# Patient Record
Sex: FEMALE | Race: WHITE | Employment: FULL TIME | URBAN - METROPOLITAN AREA
[De-identification: names, ages, dates, MRNs, and addresses within clinical notes are randomized per-mention and may not be internally consistent; named-entity substitution may affect disease eponyms.]

---

## 2024-09-19 ENCOUNTER — HOSPITAL ENCOUNTER (OUTPATIENT)
Dept: MAMMOGRAPHY | Facility: HOSPITAL | Age: 29
Discharge: HOME | End: 2024-09-19
Attending: FAMILY MEDICINE
Payer: MEDICAID

## 2024-09-19 DIAGNOSIS — N63.10 MASS OF RIGHT BREAST, UNSPECIFIED QUADRANT: ICD-10-CM

## 2024-10-03 ENCOUNTER — HOSPITAL ENCOUNTER (OUTPATIENT)
Dept: MAMMOGRAPHY | Facility: HOSPITAL | Age: 29
Discharge: HOME | End: 2024-10-03
Attending: FAMILY MEDICINE
Payer: MEDICAID

## 2024-10-03 DIAGNOSIS — N63.0 BREAST LUMP: ICD-10-CM

## 2024-10-03 PROCEDURE — 77066 DX MAMMO INCL CAD BI: CPT | Performed by: FAMILY MEDICINE

## 2024-10-03 PROCEDURE — 76642 ULTRASOUND BREAST LIMITED: CPT | Performed by: FAMILY MEDICINE

## 2024-10-03 PROCEDURE — 77062 BREAST TOMOSYNTHESIS BI: CPT | Performed by: FAMILY MEDICINE

## 2024-10-03 NOTE — IMAGING NOTE
Pita Paiz is recommended for an ultrasound guided biopsy of the right breast x one site by Dr.Chaudhari Pita Paiz's preferred language is Bahraini  1439: With the assistance of language line  [facility] [id number] spoke with Pita Ureñajaja  -Screening and education complete  -Medications and allergies reviewed.  -Ultrasound guided breast biopsy procedure reviewed including pre and post procedure instruction-see below.    History Right breast Mass  Findings- Ultrasound examination demonstrates a mass measuring approximately 1.9 x 1.9 x 1.8 cm.  This is suspicious for a fibroadenoma and is likely benign, however an ultrasound-guided biopsy is recommended.   Recommendation- ULTRASOUND-GUIDED BIOPSY: RIGHT BREAST    See EMR for complete imaging report    Medications and allergies reviewed:  No current outpatient medications on file.       Pita Izabel denies the use of prescribed anticoagulants, denies known bleeding disorders and/or liver disease, denies chemotherapy    Procedure explained and questions answered.   Pita Paiz provided with [place]i written educational material by the imaging staff at the time of the recommendation. .     Ms. Paiz instructed to take 1000 mg of acetaminophen on the day of the biopsy, eat a light meal, and bring or wear a sport bra.  Post biopsy care and instruction reviewed: including no lifting more than five pounds, no upper body exercise, icing of biopsy site, no submerging in water.  Pita Paiz verbalized understanding.    Pita Paiz informed that the [facility] schedulers would be contacting her once the biopsy order is received to schedule an appointment.   Pita Paiz requesting Friday, October 11 appointment.

## 2024-10-18 ENCOUNTER — HOSPITAL ENCOUNTER (OUTPATIENT)
Dept: MAMMOGRAPHY | Facility: HOSPITAL | Age: 29
Discharge: HOME | End: 2024-10-18
Attending: FAMILY MEDICINE
Payer: MEDICAID

## 2024-10-18 DIAGNOSIS — N63.0 BREAST NODULE: ICD-10-CM

## 2024-10-18 PROCEDURE — 19083 BX BREAST 1ST LESION US IMAG: CPT | Performed by: FAMILY MEDICINE

## 2024-10-18 PROCEDURE — 88305 TISSUE EXAM BY PATHOLOGIST: CPT

## 2024-10-18 PROCEDURE — 77065 DX MAMMO INCL CAD UNI: CPT | Performed by: FAMILY MEDICINE

## 2024-10-18 NOTE — DISCHARGE INSTRUCTIONS
Instrucciones de garrett-Proveedor de pedidos  Estereotáctica / Ultrasonido / Biopsia con aguja gruesa por resonancia magnética       Coloque mukesh compresa de hielo en la parte superior del sitio de la biopsia en coronel sostén O en los pliegues de la envoltura Ace hector 10 a 15 minutos cada hora hasta la hora de acostarse para coronel comodidad y para disminuir el sangrado.     Mantenga coronel sostén de apoyo O la envoltura Ace en coornel lugar hector 24 horas después de coronel biopsia. Esta compresión disminuye el sangrado y el movimiento de los senos para coronel comodidad. Use un sostén de apoyo hector los próximos días para mayor comodidad (así andrea para dormir)     No bañarse ni ducharse hector las primeras 24 horas después de la biopsia. Después de cecilia tiempo, puede odessa un baño o mukesh ducha. No pasa nada si las tiras estériles se mojan ninfa no las empapes.   No hay saunas, jacuzzis o piscinas hasta que las tiras estériles se caigan (5-7 días). Arbuckle previene la infección y da tiempo para que el área se cierre y sane por completo.     NO retire las tiras estériles. Se caerán en 5 días a dos semanas. Si se desarrolla algún tipo de irritación (enrojecimiento, picazón o ampollas) en el área alrededor de las tiras estériles, retírelas suavemente. Mantenga el área limpia y seca.     Es normal tener molestias leves y hematomas en el sitio de la biopsia.      Puede odessa Tylenol según sea necesario para las molestias.     NO participe en actividades extenuantes (aeróbicos, levantamiento de objetos pesados, tareas domésticas, jardinería, etc.) 48 horas después de la biopsia para prevenir el sangrado.     Recibirá los resultados de croonel proveedor de pedidos. Póngase en contacto con ellos si tiene alguna pregunta.    Si tiene alguna pregunta sobre el procedimiento, comuníquese con la enfermera coordinadora de atención mamaria al [phone]. (L-F 7:30-4)    Si se va a someter a mukesh biopsia de mama por resonancia magnética o a mukesh biopsia guiada  por ecografía, se le facturará la biopsia y mukesh mamografía unilateral por separado.    Se recomendará mukesh mamografía/ecografía diagnóstica de seguimiento de 6 meses o un año para documentar la estabilidad del sitio de la biopsia. Se puede programar en el [facility] o en el [facility] kristyn enamorado [phone]. Necesitará mukesh orden para cecilia examen de coronel médico remitente.    5/2024

## 2024-10-22 ENCOUNTER — TELEPHONE (OUTPATIENT)
Dept: MAMMOGRAPHY | Facility: HOSPITAL | Age: 29
End: 2024-10-22

## 2024-10-22 NOTE — TELEPHONE ENCOUNTER
Telephoned Pita Ureñanjgertrudisemilia with the assistance of  [facility] # [id number] and name,  verified with patient. Notified Pita Gonzalezemilia of right breast negative for malignancy but positive for fibroepithelial lesion  biopsy result. Concordance pending. Pita Ureñajaja reports biopsy site is healing well.  Radiologist recommends surgical consultation. The patient was referred to Dr Kearney or Dr Mccain. The contact information was provided to the patient and the patient was instructed to call for a consultation appt. Pt verbalized understanding and had no further questions at this time.

## 2024-10-31 NOTE — CONSULTS
Breast Surgery New Patient Consultation    Pita Paiz is a 29 year old patient, referred by Dr. Kendrick, who presents for evaluation of right breast fibroepithelial lesion.    History of Present Illness:   Ms. Pita Paiz is a 29 year old woman  who presents for evaluation of right breast fibroepithelial lesion.     The patient had a right breast diagnostic mammogram on 10/3/2024 due to a mass of the right breast.  Ultrasound showed a mass measuring 1.9 x 1.9 x 1.8 cm.  This was suspicious for fibroadenoma however biopsy was recommended.  This was located at the 11 o'clock position.  Biopsy was performed on 10/18/2024, a vision clip was placed.  Of note the patient had a hematoma.  Of note the patient has density C breast tissue.  Pathology showed a fibroepithelial lesion and phyllodes tumor cannot be excluded.    She denies any nipple discharge, skin changes, or axillary adenopathy. She does not have breast pain. She does not have significant past history for breast diseases or breast biopsy. She does not have family history of breast cancer. She does \not have a history of genetic testing. She is from [facility] but lived in [place] [place] [facility] for 14 years.           History reviewed. No pertinent past medical history.    Past Surgical History:   Procedure Laterality Date    Susanne localization wire 1 site right (cpt=19281)      DAVID AGE 23       Gynecological History:  Menarche at age 10 and LMP 22  Pt is a   Pt was 23 years old at time of first pregnancy.    She has cumulative breastfeeding history of 3 months.  Age of Menopause: n/a  Type: n/a  She denies any history of hormone replacement therapy.  She has history of oral contraceptive use for 5 years, last .   She denies infertility treatment to achieve pregnancy.    Medications:    No outpatient medications have been marked as taking for the 24 encounter (Office Visit) with Marcelina Kearney MD.       Allergies:    Allergies[1]    Family History:   History  reviewed. No pertinent family history.    She is not of Ashkenazi Temple ancestry.    Social History:  History   Alcohol use Not on file       History   Smoking Status    Not on file   Smokeless Tobacco    Not on file       Review of Systems:    Review of Systems   Constitutional:  Negative for activity change, appetite change, chills, fatigue and unexpected weight change.   HENT:  Negative for ear pain, hearing loss, nosebleeds, sore throat, trouble swallowing and voice change.    Eyes:  Negative for pain and visual disturbance.   Respiratory:  Negative for cough, chest tightness and shortness of breath.    Cardiovascular:  Negative for chest pain, palpitations and leg swelling.   Gastrointestinal:  Negative for nausea, vomiting, abdominal pain, diarrhea, constipation and blood in stool.   Endocrine: Negative for cold intolerance and heat intolerance.   Genitourinary:  Negative for dysuria, hematuria and difficulty urinating.   Musculoskeletal:  Negative for back pain, joint swelling, joint pain and neck pain.   Skin:  Negative for color change, rash and wound.   Allergic/Immunologic: Negative for environmental allergies.   Neurological:  Negative for tremors, syncope, facial asymmetry, speech difficulty and weakness.   Hematological:  Negative for adenopathy. Does not bruise/bleed easily.   Psychiatric/Behavioral:  Negative for hallucinations, behavioral problems, confusion, agitation and depressed mood.       Otherwise as per HPI.    Physical Exam:    There were no vitals taken for this visit.    Physical Exam  Vitals reviewed.   Constitutional:       Appearance: Normal appearance.   HENT:      Head: Normocephalic and atraumatic.   Eyes:      Extraocular Movements: Extraocular movements intact.      Pupils: Pupils are equal, round, and reactive to light.   Cardiovascular:      Rate and Rhythm: Normal rate.   Pulmonary:      Effort: Pulmonary effort is normal.   Chest:   Breasts:     Right: Mass present. No nipple  discharge, skin change or tenderness.      Left: Normal. No mass, nipple discharge, skin change or tenderness.       Abdominal:      General: Abdomen is flat.      Palpations: Abdomen is soft.   Musculoskeletal:         General: Normal range of motion.      Cervical back: Normal range of motion and neck supple.   Lymphadenopathy:      Upper Body:      Right upper body: No supraclavicular or axillary adenopathy.      Left upper body: No supraclavicular or axillary adenopathy.   Skin:     General: Skin is warm and dry.   Neurological:      General: No focal deficit present.      Mental Status: She is alert and oriented to person, place, and time.   Psychiatric:         Mood and Affect: Mood normal.          Bra size: L    Labs/imaging: reviewed in EPIC    Impression:   Ms. Pita Paiz is a 29 year old woman that presents for evaluation of right breast fibroepithelial lesion.    Discussion and Plan:  I had a discussion with the Patient and her friend regarding her breast exam. On exam today I found a 2 x 3 cm mass in the right breast, about 11:00, 2 cm from nipple. I personally reviewed her recent imaging and pathology and we discussed this. A friend was used for translation purposes.     We discussed that a fibroadenoma is a benign growth of tissue that does not increase risk of breast cancer. We discussed that fibroadenomas are usually removed if they have rapid growth, are over 3 cm in size, have an irregular shape/indistinct margins on imaging, or are causing breast deformity.  Pathology showing \"fibroepithelial lesion\" could mean the patient has a fibroadenoma or a phyllodes tumor. We discussed that phyllodes tumor can be benign borderline or malignant.  We discussed that at times only final pathology can reveal a diagnosis.  At this point we should remove this lesion in order to confirm final pathology.  I do recommend excision at this time.  We discussed right breast wire localized excisional biopsy.  We  discussed the risks and benefits of surgery and the patient would like to proceed.     Plan: Right breast wire localized excisional biopsy  Localize: Right breast fibroepithelial lesion, vision clip (11:00 position)      She was given ample opportunity for questions and those questions were answered to her satisfaction. She has been encouraged to contact the office with any questions or concerns. This encounter lasted a total of 50 minutes, more than 50% of which was dedicated to the discussion of management options.     Marcelina Kearney MD  Breast Surgical Oncology    CC: Dr. Kendrick            [1] Not on File

## 2024-11-05 ENCOUNTER — OFFICE VISIT (OUTPATIENT)
Dept: SURGERY | Facility: CLINIC | Age: 29
End: 2024-11-05
Payer: MEDICAID

## 2024-11-05 VITALS
WEIGHT: 151.63 LBS | HEART RATE: 94 BPM | RESPIRATION RATE: 16 BRPM | OXYGEN SATURATION: 94 % | DIASTOLIC BLOOD PRESSURE: 71 MMHG | TEMPERATURE: 98 F | SYSTOLIC BLOOD PRESSURE: 111 MMHG

## 2024-11-05 DIAGNOSIS — D24.1 FIBROADENOMA OF BREAST, RIGHT: Primary | ICD-10-CM

## 2024-11-05 PROCEDURE — 99245 OFF/OP CONSLTJ NEW/EST HI 55: CPT | Performed by: SURGERY

## 2024-11-05 NOTE — PATIENT INSTRUCTIONS
Dr. 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  You will need an adult  to stay with you the first night following your surgery.  You must remove any kind of makeup, acrylic nails, lotions, powders, creams or deodorant.  [facility] ONLY: Pre-admission will give instructions on when to take Gatorade and Tylenol/acetaminophen prior to your surgery, purchase 2 - 12oz bottles of regular Gatorade (NOT RED/SUGAR FREE). Otherwise, you may not eat or drink anything else after 11PM the night before surgery.    Wear comfortable clothing that can be easily removed.  If you wear dentures, contacts lenses, or any prosthesis, you will be asked to remove them.  Do not drink alcohol or smoke 24 hours prior to your procedure.  Bring a picture ID and your insurance card.  Covid-19 testing is no longer required before surgery unless you are experiencing symptoms such as fever, cough, congestion, etc.  The Pre-Admission Testing Department will call the day before to confirm your procedure, give you the time you need to arrive by and directions on where to go. They begin making calls after 2pm, if you are not contacted by 4pm, please call the surgeon's office listed above.  Do not take any blood thinners at least one week prior to the procedure/surgery. This includes aspirin, baby aspirin, Motrin, Ibuprofen, herbal supplements, diet  medications, vitamin E, fish oil and green tea supplements. Please check other supplements for these ingredients. *TYLENOL or acetaminophen is acceptable*  If you take Coumadin, Plavix, Xarelto, or Eliquis, please contact your prescribing physician for special instructions on how long to hold. If you take insulin contact your primary care physician for special instructions.  Our Surgery scheduler, Regi, will be contacting you to discuss surgery dates. If you have any questions related to scheduling your surgery, please reach out to her at [phone].  _____________________________________________________________________  PRE-OPERATIVE TESTING Not required

## 2024-11-06 ENCOUNTER — TELEPHONE (OUTPATIENT)
Dept: MAMMOGRAPHY | Facility: HOSPITAL | Age: 29
End: 2024-11-06

## 2024-11-06 ENCOUNTER — TELEPHONE (OUTPATIENT)
Dept: SURGERY | Facility: CLINIC | Age: 29
End: 2024-11-06

## 2024-11-06 DIAGNOSIS — D24.1 FIBROADENOMA OF BREAST, RIGHT: Primary | ICD-10-CM

## 2024-11-06 NOTE — TELEPHONE ENCOUNTER
Spoke with Pita Paiz, with the assistance of language line  regarding needle localization process of breast for excisional biopsy scheduled for 12-13-24 with Dr. Kearney. Procedure explained and all questions answered. Pt to be transported via W/C through [facility] to [facility] in [facility]. Pt verbalized understanding and had no further questions at this time.

## 2024-11-06 NOTE — TELEPHONE ENCOUNTER
Calling pt in regards to scheduling surgery.  Informed pt that I have 12/13/2024 available at [facility] with Dr. Kearney.  Pt verbalized understanding and in agreement with date and location.  All questions answered.   Encouraged pt to call or [employer] message office with any other questions or concerns.

## 2024-12-13 ENCOUNTER — ANESTHESIA EVENT (OUTPATIENT)
Dept: SURGERY | Facility: HOSPITAL | Age: 29
End: 2024-12-13
Payer: MEDICAID

## 2024-12-13 ENCOUNTER — HOSPITAL ENCOUNTER (OUTPATIENT)
Facility: HOSPITAL | Age: 29
Setting detail: HOSPITAL OUTPATIENT SURGERY
Discharge: HOME | End: 2024-12-13
Attending: SURGERY | Admitting: SURGERY
Payer: MEDICAID

## 2024-12-13 ENCOUNTER — APPOINTMENT (OUTPATIENT)
Dept: MAMMOGRAPHY | Facility: HOSPITAL | Age: 29
End: 2024-12-13
Attending: SURGERY
Payer: MEDICAID

## 2024-12-13 ENCOUNTER — HOSPITAL ENCOUNTER (OUTPATIENT)
Dept: MAMMOGRAPHY | Facility: HOSPITAL | Age: 29
Discharge: HOME | End: 2024-12-13
Attending: SURGERY | Admitting: SURGERY
Payer: MEDICAID

## 2024-12-13 ENCOUNTER — ANESTHESIA (OUTPATIENT)
Dept: SURGERY | Facility: HOSPITAL | Age: 29
End: 2024-12-13
Payer: MEDICAID

## 2024-12-13 VITALS
BODY MASS INDEX: 24.05 KG/M2 | SYSTOLIC BLOOD PRESSURE: 108 MMHG | HEART RATE: 72 BPM | HEIGHT: 66 IN | DIASTOLIC BLOOD PRESSURE: 68 MMHG | TEMPERATURE: 97 F | WEIGHT: 149.63 LBS | OXYGEN SATURATION: 100 % | RESPIRATION RATE: 18 BRPM

## 2024-12-13 DIAGNOSIS — D24.1 FIBROADENOMA OF BREAST, RIGHT: ICD-10-CM

## 2024-12-13 LAB — B-HCG UR QL: NEGATIVE

## 2024-12-13 PROCEDURE — 0HBT0ZX EXCISION OF RIGHT BREAST, OPEN APPROACH, DIAGNOSTIC: ICD-10-PCS | Performed by: SURGERY

## 2024-12-13 PROCEDURE — 19285 PERQ DEV BREAST 1ST US IMAG: CPT | Performed by: SURGERY

## 2024-12-13 PROCEDURE — 77065 DX MAMMO INCL CAD UNI: CPT | Performed by: SURGERY

## 2024-12-13 PROCEDURE — 88305 TISSUE EXAM BY PATHOLOGIST: CPT | Performed by: SURGERY

## 2024-12-13 PROCEDURE — 76098 X-RAY EXAM SURGICAL SPECIMEN: CPT | Performed by: SURGERY

## 2024-12-13 PROCEDURE — 81025 URINE PREGNANCY TEST: CPT

## 2024-12-13 RX ORDER — LABETALOL HYDROCHLORIDE 5 MG/ML
5 INJECTION, SOLUTION INTRAVENOUS EVERY 5 MIN PRN
Status: DISCONTINUED | OUTPATIENT
Start: 2024-12-13 | End: 2024-12-13

## 2024-12-13 RX ORDER — SODIUM CHLORIDE, SODIUM LACTATE, POTASSIUM CHLORIDE, CALCIUM CHLORIDE 600; 310; 30; 20 MG/100ML; MG/100ML; MG/100ML; MG/100ML
INJECTION, SOLUTION INTRAVENOUS CONTINUOUS
Status: DISCONTINUED | OUTPATIENT
Start: 2024-12-13 | End: 2024-12-13

## 2024-12-13 RX ORDER — ACETAMINOPHEN 500 MG
1000 TABLET ORAL ONCE
Status: DISCONTINUED | OUTPATIENT
Start: 2024-12-13 | End: 2024-12-13 | Stop reason: HOSPADM

## 2024-12-13 RX ORDER — LIDOCAINE HYDROCHLORIDE AND EPINEPHRINE 10; 10 MG/ML; UG/ML
INJECTION, SOLUTION INFILTRATION; PERINEURAL AS NEEDED
Status: DISCONTINUED | OUTPATIENT
Start: 2024-12-13 | End: 2024-12-13

## 2024-12-13 RX ORDER — MIDAZOLAM HYDROCHLORIDE 1 MG/ML
INJECTION INTRAMUSCULAR; INTRAVENOUS AS NEEDED
Status: DISCONTINUED | OUTPATIENT
Start: 2024-12-13 | End: 2024-12-13 | Stop reason: SURG

## 2024-12-13 RX ORDER — ACETAMINOPHEN 500 MG
1000 TABLET ORAL ONCE AS NEEDED
Status: DISCONTINUED | OUTPATIENT
Start: 2024-12-13 | End: 2024-12-13

## 2024-12-13 RX ORDER — NALOXONE HYDROCHLORIDE 0.4 MG/ML
0.08 INJECTION, SOLUTION INTRAMUSCULAR; INTRAVENOUS; SUBCUTANEOUS AS NEEDED
Status: DISCONTINUED | OUTPATIENT
Start: 2024-12-13 | End: 2024-12-13

## 2024-12-13 RX ORDER — HEPARIN SODIUM 5000 [USP'U]/ML
5000 INJECTION, SOLUTION INTRAVENOUS; SUBCUTANEOUS ONCE
Status: COMPLETED | OUTPATIENT
Start: 2024-12-13 | End: 2024-12-13

## 2024-12-13 RX ORDER — IBUPROFEN 600 MG/1
600 TABLET, FILM COATED ORAL EVERY 6 HOURS PRN
Qty: 50 TABLET | Refills: 0 | Status: SHIPPED | COMMUNITY
Start: 2024-12-13

## 2024-12-13 RX ORDER — ACETAMINOPHEN 500 MG
1000 TABLET ORAL EVERY 6 HOURS PRN
Qty: 50 TABLET | Refills: 0 | Status: SHIPPED | COMMUNITY
Start: 2024-12-13

## 2024-12-13 RX ORDER — ONDANSETRON 2 MG/ML
4 INJECTION INTRAMUSCULAR; INTRAVENOUS EVERY 6 HOURS PRN
Status: DISCONTINUED | OUTPATIENT
Start: 2024-12-13 | End: 2024-12-13

## 2024-12-13 RX ORDER — HYDROMORPHONE HYDROCHLORIDE 1 MG/ML
0.6 INJECTION, SOLUTION INTRAMUSCULAR; INTRAVENOUS; SUBCUTANEOUS EVERY 5 MIN PRN
Status: DISCONTINUED | OUTPATIENT
Start: 2024-12-13 | End: 2024-12-13

## 2024-12-13 RX ORDER — MEPERIDINE HYDROCHLORIDE 25 MG/ML
12.5 INJECTION INTRAMUSCULAR; INTRAVENOUS; SUBCUTANEOUS AS NEEDED
Status: DISCONTINUED | OUTPATIENT
Start: 2024-12-13 | End: 2024-12-13

## 2024-12-13 RX ORDER — DIAZEPAM 5 MG/1
5 TABLET ORAL EVERY 30 MIN PRN
Status: DISCONTINUED | OUTPATIENT
Start: 2024-12-13 | End: 2024-12-13 | Stop reason: HOSPADM

## 2024-12-13 RX ORDER — DIPHENHYDRAMINE HYDROCHLORIDE 50 MG/ML
12.5 INJECTION INTRAMUSCULAR; INTRAVENOUS AS NEEDED
Status: DISCONTINUED | OUTPATIENT
Start: 2024-12-13 | End: 2024-12-13

## 2024-12-13 RX ORDER — HYDROMORPHONE HYDROCHLORIDE 1 MG/ML
0.2 INJECTION, SOLUTION INTRAMUSCULAR; INTRAVENOUS; SUBCUTANEOUS EVERY 5 MIN PRN
Status: DISCONTINUED | OUTPATIENT
Start: 2024-12-13 | End: 2024-12-13

## 2024-12-13 RX ORDER — HYDROCODONE BITARTRATE AND ACETAMINOPHEN 5; 325 MG/1; MG/1
2 TABLET ORAL ONCE AS NEEDED
Status: DISCONTINUED | OUTPATIENT
Start: 2024-12-13 | End: 2024-12-13

## 2024-12-13 RX ORDER — BUPIVACAINE HYDROCHLORIDE 5 MG/ML
INJECTION, SOLUTION EPIDURAL; INTRACAUDAL AS NEEDED
Status: DISCONTINUED | OUTPATIENT
Start: 2024-12-13 | End: 2024-12-13

## 2024-12-13 RX ORDER — PROCHLORPERAZINE EDISYLATE 5 MG/ML
5 INJECTION INTRAMUSCULAR; INTRAVENOUS EVERY 8 HOURS PRN
Status: DISCONTINUED | OUTPATIENT
Start: 2024-12-13 | End: 2024-12-13

## 2024-12-13 RX ORDER — HYDROMORPHONE HYDROCHLORIDE 1 MG/ML
0.4 INJECTION, SOLUTION INTRAMUSCULAR; INTRAVENOUS; SUBCUTANEOUS EVERY 5 MIN PRN
Status: DISCONTINUED | OUTPATIENT
Start: 2024-12-13 | End: 2024-12-13

## 2024-12-13 RX ORDER — SCOLOPAMINE TRANSDERMAL SYSTEM 1 MG/1
1 PATCH, EXTENDED RELEASE TRANSDERMAL ONCE
Status: DISCONTINUED | OUTPATIENT
Start: 2024-12-13 | End: 2024-12-13 | Stop reason: HOSPADM

## 2024-12-13 RX ORDER — HYDROCODONE BITARTRATE AND ACETAMINOPHEN 5; 325 MG/1; MG/1
1 TABLET ORAL ONCE AS NEEDED
Status: DISCONTINUED | OUTPATIENT
Start: 2024-12-13 | End: 2024-12-13

## 2024-12-13 RX ORDER — EPHEDRINE SULFATE 50 MG/ML
INJECTION INTRAVENOUS AS NEEDED
Status: DISCONTINUED | OUTPATIENT
Start: 2024-12-13 | End: 2024-12-13 | Stop reason: SURG

## 2024-12-13 RX ORDER — ONDANSETRON 2 MG/ML
INJECTION INTRAMUSCULAR; INTRAVENOUS AS NEEDED
Status: DISCONTINUED | OUTPATIENT
Start: 2024-12-13 | End: 2024-12-13 | Stop reason: SURG

## 2024-12-13 RX ADMIN — EPHEDRINE SULFATE 5 MG: 50 INJECTION INTRAVENOUS at 10:21:00

## 2024-12-13 RX ADMIN — EPHEDRINE SULFATE 5 MG: 50 INJECTION INTRAVENOUS at 10:27:00

## 2024-12-13 RX ADMIN — SODIUM CHLORIDE, SODIUM LACTATE, POTASSIUM CHLORIDE, CALCIUM CHLORIDE: 600; 310; 30; 20 INJECTION, SOLUTION INTRAVENOUS at 09:48:00

## 2024-12-13 RX ADMIN — ONDANSETRON 4 MG: 2 INJECTION INTRAMUSCULAR; INTRAVENOUS at 10:18:00

## 2024-12-13 RX ADMIN — MIDAZOLAM HYDROCHLORIDE 2 MG: 1 INJECTION INTRAMUSCULAR; INTRAVENOUS at 09:48:00

## 2024-12-13 RX ADMIN — EPHEDRINE SULFATE 10 MG: 50 INJECTION INTRAVENOUS at 10:32:00

## 2024-12-13 RX ADMIN — MIDAZOLAM HYDROCHLORIDE 2 MG: 1 INJECTION INTRAMUSCULAR; INTRAVENOUS at 09:53:00

## 2024-12-13 NOTE — OPERATIVE REPORT
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]     Procedure(s):  Right breast wire localized excisional biopsy     Primary Surgeon: Marcelina Kearney MD     Surgical Assistant.: Yanira Ramirez     Anesthesia: MAC     Specimen:   ID Type Source Tests Collected by Time Destination   1 : Right breast needle loc short superior, long lateral, ink anterior Breast tissue Breast, right SURGICAL PATHOLOGY TISSUE Marcelina Kearney MD 12/13/2024 10:17 AM         Estimated Blood Loss: No data recorded     Complications: none      Indications for procedure: The patient had a right breast diagnostic mammogram on 10/3/2024 due to a mass of the right breast.  Ultrasound showed a mass measuring 1.9 x 1.9 x 1.8 cm.  This was suspicious for fibroadenoma however biopsy was recommended.  This was located at the 11 o'clock position.  Biopsy was performed on 10/18/2024, a vision clip was placed.  Of note the patient had a hematoma.  Of note the patient has density C breast tissue.  Pathology showed a fibroepithelial lesion and phyllodes tumor cannot be excluded.        Surgical Findings: wire and clip seen on intra-op xray       Operative Summary:  The patient was brought to the operating room and IV sedation was initiated by anesthesia team. Wire localization films were displayed. The patient was prepped and draped in the usual fashion. Time out was performed, right side was confirmed as the correct side. Local anesthetic was infiltrated at the proposed incision site and the periareolar incision was made. Skin flaps were raised toward the lesion, confirmed by wire and palpation. It was located 11:00, 3 cm from  the nipple. The mass was then removed from the remaining margins of the breast and marked with suture (Double long lateral, double short superior, ink anterior). Specimen xray was completed and showed wire and vision clip present. Additional local anesthetic was infiltrated. Hemostasis was obtained. The deep dermis was closed with 3-0 vicryl and the skin was closed with 4-0 monocryl. The incision was dressed with dermabond, gauze, fluffs, and a bra. The patient was taken to recovery in stable condition. Assistance from a surgical assistant was necessary for optimal visualization during the case.        Implants: * No implants in log *     Drains: none     Condition: stable  DC home with over the counter pain medication       Marcelina Kearney MD

## 2024-12-13 NOTE — ANESTHESIA PREPROCEDURE EVALUATION
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  Temp: 97.3 °F (36.3 °C)  Pulse: 67  Resp: 17  BP: 106/79  SpO2: 99 %  Body mass index is 24.15 kg/m².    Current medications reviewed.  Hospital Medications:   acetaminophen (Tylenol Extra Strength) tab 1,000 mg  1,000 mg Oral Once    scopolamine (Transderm-Scop) 1 MG/3DAYS patch 1 patch  1 patch Transdermal Once    lactated ringers infusion   Intravenous Continuous    [COMPLETED] heparin (Porcine) 5000 UNIT/ML injection 5,000 Units  5,000 Units Subcutaneous Once    ceFAZolin (Ancef) 2g in 10mL IV syringe premix  2 g Intravenous Once    diazePAM (Valium) tab 5 mg  5 mg Oral Q30 Min PRN       Outpatient Medications:   Prescriptions Prior to Admission[1]    Allergies: Patient has no known allergies.      Anesthesia Evaluation    Patient summary reviewed.    Anesthetic Complications           GI/Hepatic/Renal    Negative GI/hepatic/renal ROS.                             Cardiovascular    Negative cardiovascular ROS.                                                   Endo/Other    Negative endo/other ROS.                              Pulmonary    Negative pulmonary ROS.                       Neuro/Psych    Negative neuro/psych ROS.                                  Past Surgical History:   Procedure Laterality Date    Susanne localization wire 1 site right (cpt=19281)      DAVID AGE 23     Social History     Socioeconomic History    Marital status:    Tobacco Use    Smoking status: Never    Smokeless tobacco: Never   Vaping Use    Vaping status: Every Day    Substances: Flavoring    Devices: Disposable   Substance and Sexual Activity    Alcohol  use: Not Currently    Drug use: Never     History   Drug Use Unknown     Available pre-op labs reviewed.               Airway      Mallampati: II  Mouth opening: >3 FB  TM distance: 4 - 6 cm  Neck ROM: full Cardiovascular      Rhythm: regular  Rate: normal     Dental             Pulmonary    Pulmonary exam normal.                 Other findings              ASA: 1   Plan: MAC and general  NPO status verified and           Plan/risks discussed with: patient and spouse                Present on Admission:  **None**             [1]   Medications Prior to Admission   Medication Sig Dispense Refill Last Dose/Taking    Multiple Vitamins-Minerals (MULTIVITAL OR) Take by mouth.   Past Week    Pyridoxine HCl (VITAMIN B-6 OR) Take by mouth.   Past Week    Ferrous Gluconate (IRON 27 OR) Take by mouth.   Past Week    Magnesium Gluconate (MAGNESIUM 27 OR) Take by mouth.   Past Week

## 2024-12-13 NOTE — IMAGING NOTE
Assisted  with ultrasound guided needle localization of the right breast.   Pita Paiz's preferred language is Danish.  [id number]: With the assistance of language line  Teresa ID #[id number]  -Pita Paiz identified with spelling of name and date of birth.   -Medications and allergies reviewed. NKDA reported    History:  right breast--Fibroepithelial lesion.   Surgery: Right breast wire localized excisional biopsy     Order verified.  Procedure explained with the assistance of language line  Teresa ID # [id number]. Pita Paiz verbalized understanding and agreement.  Educational material provided  0835: Written consent obtained.     Language line  Teresa ID # [id number] remained on the speaker phone throughout the localization procedure until Ms. Paiz brought to mammography for post localization images.    0837: Scans taken by Edda-ultrasound technologist    0846: Dr. Rodríguez present    0847: Time out complete.    0847: Site prepped in a sterile manner by the imaging technologist  Site draped in a sterile manner by Dr. Rodríguez  0848: Lidocaine administered for anesthetic affect.  0849: Kay 20G x 7.5cm needle placed- right breast 11 o'clock    Emotional support provided.  Pita Paiz tolerated procedure well.     Site cleaned.  Wire secured with blue clip, steri strips, sterile 4x4 gauze dressing, and Tegaderm.     0902: Pita Paiz brought to mammography for post localization images in stable condition. Ms. Paiz without complaints or concerns at this time.   Transfer of care to mammography technologist  Mammography staff to discharge Pita Paiz to surgery holding after imaging complete.

## 2024-12-13 NOTE — ANESTHESIA POSTPROCEDURE EVALUATION
[facility]    Pita Paiz Patient Status:  Hospital Outpatient Surgery   Age/Gender 29 year old female MRN [id number]   Location [facility] PERIOPERATIVE SERVICE Attending Marcelina Kearney MD   Hosp Day # 0 PCP Cortes Kendrick MD       Anesthesia Post-op Note    Right breast wire localized excisional biopsy    Procedure Summary       Date: 12/13/24 Room / Location:  MAIN OR 11 / [facility] MAIN OR    Anesthesia Start: 0948 Anesthesia Stop: 1048    Procedure: Right breast wire localized excisional biopsy (Right: Breast) Diagnosis:       Fibroadenoma of breast, right      (Fibroadenoma of breast, 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    Patient Location: PACU    Anesthesia Type: MAC    Airway Patency: patent    Postop Pain Control: adequate    Mental Status: mildly sedated but able to meaningfully participate in the post-anesthesia evaluation    Nausea/Vomiting: none    Cardiopulmonary/Hydration status: stable euvolemic    Complications: no apparent anesthesia related complications    Postop vital signs: stable    Dental Exam: Unchanged from Preop    Patient to be discharged from PACU when criteria met.

## 2024-12-13 NOTE — DISCHARGE INSTRUCTIONS
Breast Surgery  Post-operative Instructions    Marcelina Kearney MD  General Instructions  The following instructions will provide helpful information that will assist your recovery. These are designed to be general guidelines. Please remember that everyone heals and recovers differently. Listen to your body and rest when you are tired. If you have any questions or concerns, please do not hesitate to contact my office. I would like to see you in the office about one week after surgery - usually you already have an appointment made before the surgery takes place. If not, please schedule and appointment through my office: [phone].    Restrictions  No lifting anything greater than a gallon of milk (>10 lbs) for 1 week after surgery. After that, you may gradually increase the amount of weight based on your comfort level. You should avoid a lot of repetitious activity with the arm until the wound is well-healed (about two weeks).   You should not drive a car until you believe you can react to an emergency situation  You may shower the day after surgery. You should not bathe or swim (i.e. submerge wound) until the wound is well healed (about 2-4 weeks).  There are no dietary restrictions.    Wound Care  You may shower on the day after surgery. There is a clear glue-like dressing over your incision. You should leave this in place and it will start to peel off about 10 days after your surgery. The stitches are all underneath the skin and will dissolve on their own.  Let soapy water run over the incision, don't scrub the skin.   You can keep a gauze dressing on the wound until the wound is completely dry and without drainage-usually 1-3 days. You can always use gauze for comfort if the incisions are rubbing on your clothing.  If a surgical bra was placed after the surgery, I encourage you to wear it as much as possible during the week following the procedure (including during sleep). You can also use gauze against the  area of your surgery to help with extra compression. Compression will help avoid fluid retention and fluid collection. Alternatively, you may choose to wear your own bra provided it is comfortable, provides support and does not have an underwire. If the breast doesn't move it is less painful.   It is normal to feel a lump in the area of the incisions for up to 6 months. This is part of the healing process. Eventually the breast will return to its normal condition.   Pain Medication  We recommend you mainly use alternating Tylenol/acetaminophen or Motrin/ibuprofen/Advil for pain control. We recommend 1000 mg Tylenol every 6 hours (do not exceed 4000 mg Tylenol in any 24 hour period) and ibuprofen 600 mg every 6 hours. Usually, this is just prescribed over the counter and you may use what you have at home.  Using an ice pack for 10-20 minutes at a time over the incision can also alleviate pain.    Pathology Report  The Pathology report is usually available 5-7 business days following the surgery. Dr. Kearney will call you or send you a [employer] message with the results once the report is available.    Notify my office if:   Your temperature is over 101.5 F   You notice increasing swelling, redness, warmth or drainage from around the incision or drain site.    If you experience any problems, please call my office and either my nurse or myself will respond. After hours, you will be forwarded to my answering service which will help you get in touch with myself or the physician covering for me.  Office: [phone]

## 2024-12-13 NOTE — H&P
History and Physical     Pita Paiz Patient Status:  Hospital Outpatient Surgery    1995 MRN [id number]   Location [facility] PERIOPERATIVE SERVICE Attending Marcelina Kearney MD   Hosp Day # 0 PCP Cortes Kendrick MD     Chief Complaint: right breast fibroepithelial lesion    Subjective:    The patient had a right breast diagnostic mammogram on 10/3/2024 due to a mass of the right breast.  Ultrasound showed a mass measuring 1.9 x 1.9 x 1.8 cm.  This was suspicious for fibroadenoma however biopsy was recommended.  This was located at the 11 o'clock position.  Biopsy was performed on 10/18/2024, a vision clip was placed.  Of note the patient had a hematoma.  Of note the patient has density C breast tissue.  Pathology showed a fibroepithelial lesion and phyllodes tumor cannot be excluded.     She is here for right breast wire localized excisional biopsy    History/Other:      Past Medical History:History reviewed. No pertinent past medical history.     Past Surgical History:   Past Surgical History:   Procedure Laterality Date    Susanne localization wire 1 site right (cpt=19281)      DAVID AGE 23       Social History:  reports that she has never smoked. She has never used smokeless tobacco. She reports that she does not currently use alcohol. She reports that she does not use drugs.    Family History: History reviewed. No pertinent family history.    Allergies: Allergies[1]    Medications:  Medications Ordered Prior to Encounter[2]    Review of Systems:   A comprehensive 14 point review of systems was completed.    Pertinent positives and negatives noted in the HPI.    Objective:     Ht 1.676 m (5' 6\")   Wt 67 kg (147 lb 11.3 oz)   LMP 10/15/2024 (Exact Date)   BMI 23.84 kg/m²     General: No acute distress  HEENT: Normocephalic atraumatic. Moist mucous membranes  Neck: Supple  Respiratory: Nonlabored breathing  Cardiovascular: Regular rate and rhythm  Breast: Exam unchanged from previous consultation    Abdomen: Soft, nontender, nondistended  Neurologic: No focal neurological deficits  Musculoskeletal: Moves all extremities  Extremities: No edema or cyanosis  Psychiatric: Appropriate mood and affect  Integument: No rashes or lesions      Results:    Labs:  Selected labs - last 24 hours:  Endo  Lytes  Renal   Glu -  Na - Ca -  BUN -   POC Gluc  -  K - PO4 -  Cr -   A1c -  Cl - Mg -  eGFR -   TSH -  CO2 -         LFT  CBC  Other   AST -  WBC -  PTT - Procal -   ALT -  Hb -  INR - CRP -   APk -  Hct -  Trop - D dim -   T isidro -  PLT -  pBNP -  BNP -  - Ferritin  -   Prot -    CK  - Lactate  -   Alb -    LDL  - COVID  -       Imaging: Imaging data reviewed in Epic.    Assessment & Plan:    Pita Paiz is a 29 year old female with right breast fibroepithelial lesion    The risks, benefits, and alternatives were discussed including, but not limited to, seroma development, infection, and bleeding. We also discussed the possibility for upstaging of pathology which would require further surgery on another day. We discussed pathology results would be available in 5-7 business days.    Plan: right breast wire localized excisional biopsy    Marcelina Kearney MD  Breast Surgical Oncology               [1] No Known Allergies  [2]   No current facility-administered medications on file prior to encounter.     Current Outpatient Medications on File Prior to Encounter   Medication Sig Dispense Refill    Multiple Vitamins-Minerals (MULTIVITAL OR) Take by mouth.      Pyridoxine HCl (VITAMIN B-6 OR) Take by mouth.      Ferrous Gluconate (IRON 27 OR) Take by mouth.      Magnesium Gluconate (MAGNESIUM 27 OR) Take by mouth.

## 2024-12-20 ENCOUNTER — OFFICE VISIT (OUTPATIENT)
Dept: SURGERY | Facility: CLINIC | Age: 29
End: 2024-12-20
Payer: MEDICAID

## 2024-12-20 VITALS
TEMPERATURE: 98 F | HEART RATE: 65 BPM | RESPIRATION RATE: 16 BRPM | SYSTOLIC BLOOD PRESSURE: 118 MMHG | OXYGEN SATURATION: 100 % | DIASTOLIC BLOOD PRESSURE: 73 MMHG

## 2024-12-20 DIAGNOSIS — D24.1 FIBROADENOMA OF BREAST, RIGHT: Primary | ICD-10-CM

## 2024-12-20 NOTE — PROGRESS NOTES
Breast Surgery Postop Follow up     History of Present Illness:   Pita Paiz is a 29 year old woman  who presented with right breast fibroepithelial lesion now s/p right breast wire localized excisional biopsy  on 12/13. Final pathology showed 2.9 cm fibroadenoma.      Her incisions are healing well, she has been keeping them clean and dry. She is still wearing her surgical bra.  Her pain has been under control and she did not require opioid medication. She denies shortness of breath, nausea/vomiting, constipation/diarrhea.      Past medical history, surgical history, family history, allergies, and social history were updated as applicable in EPIC, otherwise see prior consultation note.    Review of Systems   Constitutional:  Negative for chills and fever.   HENT:  Negative for sore throat and trouble swallowing.    Eyes:  Negative for visual disturbance.   Respiratory:  Negative for cough, chest tightness and shortness of breath.    Cardiovascular:  Negative for chest pain, palpitations and leg swelling.   Gastrointestinal:  Negative for nausea, vomiting, abdominal pain, diarrhea, constipation and blood in stool.   Genitourinary:  Negative for dysuria, hematuria and difficulty urinating.   Skin:  Negative for rash.   Neurological:  Negative for numbness and headaches.        Physical Exam  Vitals reviewed.   Constitutional:       Appearance: Normal appearance.   HENT:      Head: Normocephalic and atraumatic.   Eyes:      Extraocular Movements: Extraocular movements intact.      Pupils: Pupils are equal, round, and reactive to light.   Cardiovascular:      Rate and Rhythm: Normal rate.   Pulmonary:      Effort: Pulmonary effort is normal.   Chest:   Breasts:     Right: Normal. No mass, nipple discharge, skin change or tenderness.      Left: Normal. No mass, nipple discharge, skin change or tenderness.          Comments: Well healing incision  Abdominal:      General: Abdomen is flat.      Palpations: Abdomen is  soft.   Musculoskeletal:         General: Normal range of motion.      Cervical back: Normal range of motion and neck supple.   Lymphadenopathy:      Upper Body:      Right upper body: No supraclavicular or axillary adenopathy.      Left upper body: No supraclavicular or axillary adenopathy.   Skin:     General: Skin is warm and dry.   Neurological:      General: No focal deficit present.      Mental Status: She is alert and oriented to person, place, and time.   Psychiatric:         Mood and Affect: Mood normal.          Labs/imaging: reviewed in [facility]     Impression:   Pita Paiz is a 29 year old woman  who presented with right breast fibroepithelial lesion now s/p right breast wire localized excisional biopsy  on 12/13. Final pathology showed 2.9 cm fibroadenoma. No increased risk of cancer. Staff member was utilized for translation.     Discussion and Plan:     Patient's pathology report was discussed with her. She is healing well.       Further screening imaging: Start screening imaging at age 38. If patient feels another mass, would order diagnostic imaging at that time    Return to clinic: 6 months     Marcelina Kearney MD  Breast Surgical Oncology    Copy: Dr. Kendrick

## (undated) DEVICE — GOWN,SIRUS,FABRIC-REINFORCED,LARGE: Brand: MEDLINE

## (undated) DEVICE — ADHESIVE SKIN TOP FOR WND CLSR DERMBND ADV

## (undated) DEVICE — SKIN REG/FINE DUAL MARKER, RULER, LABELS: Brand: MEDLINE

## (undated) DEVICE — ELECTRODE ES 2.75IN PTFE BLDE MOD E-Z CLN

## (undated) DEVICE — CLIP LIG M BLU TI HRT SHP WIRE HORZ

## (undated) DEVICE — COVER,LIGHT,CAMERA,HARD,1/PK,STRL: Brand: MEDLINE

## (undated) DEVICE — BREAST-HERNIA-PORT CDS-LF: Brand: MEDLINE INDUSTRIES, INC.

## (undated) DEVICE — SLEEVE COMPR MD KNEE LEN SGL USE KENDALL SCD

## (undated) DEVICE — SUT PERMA- 3-0 30IN SH NABSRB BLK 26MM 1/2

## (undated) DEVICE — UNDERPAD INCONT 23X36IN STD BLU BACKSHEET

## (undated) DEVICE — ANTIBACTERIAL UNDYED BRAIDED (POLYGLACTIN 910), SYNTHETIC ABSORBABLE SUTURE: Brand: COATED VICRYL

## (undated) DEVICE — PAD,ABDOMINAL,8"X7.5",ST,LF,20/BX: Brand: MEDLINE INDUSTRIES, INC.

## (undated) DEVICE — SUT PERMA- 2-0 18IN FS NABSRB BLK 26MM 3/8

## (undated) DEVICE — GLOVE SUR 7 SENSICARE PI PIP GRN PWD F

## (undated) DEVICE — SOLUTION IRRIG 1000ML 0.9% NACL USP BTL

## (undated) DEVICE — SUT MCRYL 4-0 18IN PS-2 ABSRB UD 19MM 3/8 CIR

## (undated) DEVICE — 3M™ STERI-DRAPE™ INSTRUMENT POUCH 1018: Brand: STERI-DRAPE™

## (undated) DEVICE — SUT COAT VCRL 0 27IN CT-1 ABSRB UD 36MM 1/2

## (undated) DEVICE — COVER MAYOSTAND 23X54IN NWVN FAB

## (undated) DEVICE — DRAPE PACK CHEST

## (undated) DEVICE — GLOVE SUR 6.5 SENSICARE PI PIP CRM PWD F

## (undated) DEVICE — Device